# Patient Record
Sex: FEMALE | Race: NATIVE HAWAIIAN OR OTHER PACIFIC ISLANDER
[De-identification: names, ages, dates, MRNs, and addresses within clinical notes are randomized per-mention and may not be internally consistent; named-entity substitution may affect disease eponyms.]

---

## 2019-10-04 ENCOUNTER — HOSPITAL ENCOUNTER (EMERGENCY)
Dept: HOSPITAL 5 - ED | Age: 49
Discharge: HOME | End: 2019-10-04
Payer: SELF-PAY

## 2019-10-04 VITALS — SYSTOLIC BLOOD PRESSURE: 142 MMHG | DIASTOLIC BLOOD PRESSURE: 78 MMHG

## 2019-10-04 DIAGNOSIS — Z90.49: ICD-10-CM

## 2019-10-04 DIAGNOSIS — I10: ICD-10-CM

## 2019-10-04 DIAGNOSIS — Z79.899: ICD-10-CM

## 2019-10-04 DIAGNOSIS — R42: Primary | ICD-10-CM

## 2019-10-04 LAB
BASOPHILS # (AUTO): 0.1 K/MM3 (ref 0–0.1)
BASOPHILS NFR BLD AUTO: 0.6 % (ref 0–1.8)
BUN SERPL-MCNC: 15 MG/DL (ref 7–17)
BUN/CREAT SERPL: 25 %
CALCIUM SERPL-MCNC: 9.3 MG/DL (ref 8.4–10.2)
EOSINOPHIL # BLD AUTO: 0.1 K/MM3 (ref 0–0.4)
EOSINOPHIL NFR BLD AUTO: 1 % (ref 0–4.3)
HCT VFR BLD CALC: 43.2 % (ref 30.3–42.9)
HEMOLYSIS INDEX: 11
HGB BLD-MCNC: 14.9 GM/DL (ref 10.1–14.3)
LYMPHOCYTES # BLD AUTO: 1.6 K/MM3 (ref 1.2–5.4)
LYMPHOCYTES NFR BLD AUTO: 16.1 % (ref 13.4–35)
MCHC RBC AUTO-ENTMCNC: 34 % (ref 30–34)
MCV RBC AUTO: 90 FL (ref 79–97)
MONOCYTES # (AUTO): 0.7 K/MM3 (ref 0–0.8)
MONOCYTES % (AUTO): 6.9 % (ref 0–7.3)
PLATELET # BLD: 268 K/MM3 (ref 140–440)
RBC # BLD AUTO: 4.81 M/MM3 (ref 3.65–5.03)
T4 FREE SERPL-MCNC: 1.08 NG/DL (ref 0.76–1.46)

## 2019-10-04 PROCEDURE — 84439 ASSAY OF FREE THYROXINE: CPT

## 2019-10-04 PROCEDURE — 93005 ELECTROCARDIOGRAM TRACING: CPT

## 2019-10-04 PROCEDURE — 93010 ELECTROCARDIOGRAM REPORT: CPT

## 2019-10-04 PROCEDURE — 80048 BASIC METABOLIC PNL TOTAL CA: CPT

## 2019-10-04 PROCEDURE — 70450 CT HEAD/BRAIN W/O DYE: CPT

## 2019-10-04 PROCEDURE — 83735 ASSAY OF MAGNESIUM: CPT

## 2019-10-04 PROCEDURE — 84484 ASSAY OF TROPONIN QUANT: CPT

## 2019-10-04 PROCEDURE — 82553 CREATINE MB FRACTION: CPT

## 2019-10-04 PROCEDURE — 36415 COLL VENOUS BLD VENIPUNCTURE: CPT

## 2019-10-04 PROCEDURE — 84443 ASSAY THYROID STIM HORMONE: CPT

## 2019-10-04 PROCEDURE — 99284 EMERGENCY DEPT VISIT MOD MDM: CPT

## 2019-10-04 PROCEDURE — 85025 COMPLETE CBC W/AUTO DIFF WBC: CPT

## 2019-10-04 PROCEDURE — 82550 ASSAY OF CK (CPK): CPT

## 2019-10-04 NOTE — CAT SCAN REPORT
CT head without contrast



CLINICAL HISTORY: Dizziness for one day.



FINDINGS: There appear to be a continued a mild paravertebral white matter changes most consistent wi
th microvascular angiopathy. The findings correlate with the previous CT of 8/26/2019. There is no cl
ear CT ends of acute intracranial hemorrhage or significant mass effect. The ventricular system remai
ns unchanged in size and configuration. The visualized nasal sinuses are clear. All CT scans at this 
location are performed using the CT dose reduction for ALARA by means of automated exposure control.





IMPRESSION:



There is no CT evidence of acute intracranial process.



Signer Name: Trav Doe MD 

Signed: 10/4/2019 11:03 AM

 Workstation Name: [id number]

## 2019-10-04 NOTE — EMERGENCY DEPARTMENT REPORT
HPI





- General


Chief Complaint: Dizziness


Time Seen by Provider: 10/04/19 09:13





- HPI


HPI: 





Room 23








The patient is a 49-year-old female presenting with chief complaint of 

dizziness.  The patient states past 3 days she's had a constant dizziness that 

waxes and wanes.  Patient states the dizziness worsens whenever looking down or 

change in position.  Denies any preceding trauma.  Patient admits to supple 

headache.  The patient says she checked her blood pressure home with ultrasound 

to be hypertensive with a systolic of 165.  Patient missed and nausea but denies

vomiting.








Location: [See above]


Duration: [See above]


Quality: [See above]


Severity: [See above]


Timing: [See above]


Context: [See above]


Modifying factors: [See above]


Associated signs and symptoms: [see above]








ED Past Medical Hx





- Past Medical History


Previous Medical History?: Yes


Hx Hypertension: Yes


Additional medical history: history of ectopic pregnancy, vertigo





- Surgical History


Past Surgical History?: Yes


Hx Cholecystectomy: Yes


Hx Appendectomy: Yes


Additional Surgical History: salpingectomy.  .  tummy tuck.  





- Family History


Family history: no significant





- Social History


Smoking Status: Never Smoker


Substance Use Type: None





- Medications


Home Medications: 


                                Home Medications











 Medication  Instructions  Recorded  Confirmed  Last Taken  Type


 


Losartan-Hctz 50-12.5 mg Tab 1 tab PO DAILY 11/10/18 08/26/19 08/26/19 History


 


Aspirin [Aspirin BABY CHEW TAB] 1 mg PO QDAY 19 History


 


Meclizine HCl [Meclizine CHEW] 25 mg PO Q8HR PRN 10 Days #30 19  Unknown 

Rx





 tab.chew    


 


Meclizine [Antivert] 25 mg PO QID PRN #20 tablet 10/04/19  Unknown Rx


 


Ondansetron [Zofran ODT TAB] 8 mg PO Q8HR #20 tab.rapdis 10/04/19  Unknown Rx














ED Review of Systems


ROS: 


Stated complaint: HTN/DIZZINESS


Other details as noted in HPI





Constitutional: denies: fever


Eyes: denies: eye pain


ENT: denies: throat pain


Respiratory: no symptoms reported


Cardiovascular: denies: chest pain


Endocrine: no symptoms reported


Gastrointestinal: nausea.  denies: vomiting


Genitourinary: denies: dysuria


Neurological: headache, vertigo





Physical Exam





- Physical Exam


Vital Signs: 


                                   Vital Signs











  10/04/19 10/04/19 10/04/19





  09:04 09:08 09:16


 


Temperature  98.8 F 


 


Pulse Rate 91 H 91 H 93 H


 


Respiratory 12 20 15





Rate   


 


Blood Pressure  142/89 142/89


 


O2 Sat by Pulse  96 97





Oximetry   














  10/04/19





  09:19


 


Temperature 


 


Pulse Rate 


 


Respiratory 20





Rate 


 


Blood Pressure 


 


O2 Sat by Pulse 96





Oximetry 











Physical Exam: 





GENERAL: The patient is well-developed well-nourished []. []


HEENT: Normocephalic.  Atraumatic.  Extraocular motions are intact.  Patient has

 moist mucous membranes.  No nystagmus


NECK: Supple.  Trachea midline


CHEST/LUNGS: Clear to auscultation.  There is no respiratory distress noted.


HEART/CARDIOVASCULAR: Regular.  There is no tachycardia.  There is no gallop rub

 or murmur.


ABDOMEN: Abdomen is soft, nontender.  Patient has normal bowel sounds.  There is

 no abdominal distention.


SKIN: There is no rash.  There is no edema.  There is no diaphoresis.


NEURO: The patient is awake, alert, and oriented.  The patient is cooperative.  

The patient has no focal neurologic deficits.  The patient has normal speech.  

Cranial nerves II through XII grossly intact, no drift.  No dysmetria noted with

 finger-to-nose bilaterally


MUSCULOSKELETAL:  There is no evidence of acute injury.





ED Course


                                   Vital Signs











  10/04/19 10/04/19 10/04/19





  09:04 09:08 09:16


 


Temperature  98.8 F 


 


Pulse Rate 91 H 91 H 93 H


 


Respiratory 12 20 15





Rate   


 


Blood Pressure  142/89 142/89


 


O2 Sat by Pulse  96 97





Oximetry   














  10/04/19





  09:19


 


Temperature 


 


Pulse Rate 


 


Respiratory 20





Rate 


 


Blood Pressure 


 


O2 Sat by Pulse 96





Oximetry 














ED Medical Decision Making





- Lab Data


Result diagrams: 


                                 10/04/19 09:40





                                 10/04/19 09:40





                                Laboratory Tests











  10/04/19 10/04/19 10/04/19





  09:40 09:40 09:40


 


WBC  10.2  


 


RBC  4.81  


 


Hgb  14.9 H  


 


Hct  43.2 H  


 


MCV  90  


 


MCH  31  


 


MCHC  34  


 


RDW  13.7  


 


Plt Count  268  


 


Lymph % (Auto)  16.1  


 


Mono % (Auto)  6.9  


 


Eos % (Auto)  1.0  


 


Baso % (Auto)  0.6  


 


Lymph #  1.6  


 


Mono #  0.7  


 


Eos #  0.1  


 


Baso #  0.1  


 


Seg Neutrophils %  75.4 H  


 


Seg Neutrophils #  7.7  


 


Sodium   141 


 


Potassium   3.7 


 


Chloride   103.9 


 


Carbon Dioxide   22 


 


Anion Gap   19 


 


BUN   15 


 


Creatinine   0.6 L 


 


Estimated GFR   > 60 


 


BUN/Creatinine Ratio   25 


 


Glucose   109 H 


 


Calcium   9.3 


 


Magnesium   2.00 


 


Total Creatine Kinase   61 


 


CK-MB (CK-2)   1.4 


 


CK-MB (CK-2) Rel Index   2.2 


 


Troponin T   < 0.010 


 


TSH    1.190


 


Free T4    1.08














- EKG Data


-: EKG Interpreted by Me


EKG shows normal: sinus rhythm


Rate: normal





- EKG Data


When compared to previous EKG there are: previous EKG unavailable


Interpretation: nonspecific ST-T wave yessy (T-wave inversion in lead 3)





- Radiology Data


Radiology results: report reviewed (CT head), image reviewed (CT head)





[id number] Cat

 Scan Report Signed Patient: TEOFILO DANIELLE MR#: [id number] : 1970 

Acct:[id number] Age/Sex: 49 / F ADM Date: 10/04/19 Loc: ED Attending Dr: 

Ordering Physician: NOLAN HERR MD Date of Service: 10/04/19 Procedure(s): CT 

head/brain wo con Accession Number(s): [id number] cc: NOLAN HERR MD CT head 

without contrast CLINICAL HISTORY: Dizziness for one day. FINDINGS: There appear

 to be a continued a mild paravertebral white matter changes most consistent 

with microvascular angiopathy. The findings correlate with the previous CT of 

2019. There is no clear CT ends of acute intracranial hemorrhage or s

ignificant mass effect. The ventricular system remains unchanged in size and 

configuration. The visualized nasal sinuses are clear. All CT scans at this 

location are performed using the CT dose reduction for ALARA by means of 

automated exposure control. IMPRESSION: There is no CT evidence of acute i

ntracranial process. Signer Name: Trav Doe MD Signed: 10/4/2019 11:03 

AM Workstation Name: [id number] Transcribed By: MR Dictated By: Trav Doe MD Electronically Authenticated By: Trav Doe MD Signed Date/Time: 

10/04/19 1103 DD/DT: 10/04/19 1059 TD/TT: 





- Differential Diagnosis


vertigo, ICH, intracranial mass


Critical care attestation.: 


If time is entered above; I have spent that time in minutes in the direct care 

of this critically ill patient, excluding procedure time.








ED Disposition


Clinical Impression: 


 Vertigo





Disposition: DC-01 TO HOME OR SELFCARE


Is pt being admited?: No


Does the pt Need Aspirin: No


Condition: Stable


Instructions:  Vertigo (ED)


Additional Instructions: 


Return to the emergency department should you develop worsening symptoms, 

inability to tolerate food or liquids, high fever or any other concerns


Prescriptions: 


Meclizine [Antivert] 25 mg PO QID PRN #20 tablet


 PRN Reason: Vertigo


Ondansetron [Zofran ODT TAB] 8 mg PO Q8HR #20 tab.rapdis


Referrals: 


PRIMARY CARE,MD [Primary Care Provider] - 3-5 Days


FERN MCPHERSON MD [Staff Physician] - 3-5 Days (Dr. Mcpherson is a neurologist.  

Please follow up with him for further evaluation)


Time of Disposition: 13:05

## 2020-01-07 ENCOUNTER — HOSPITAL ENCOUNTER (EMERGENCY)
Dept: HOSPITAL 5 - ED | Age: 50
Discharge: LEFT BEFORE DISCHARGE | End: 2020-01-07
Payer: SELF-PAY

## 2020-01-07 VITALS — DIASTOLIC BLOOD PRESSURE: 84 MMHG | SYSTOLIC BLOOD PRESSURE: 134 MMHG

## 2020-01-07 DIAGNOSIS — Z53.21: ICD-10-CM

## 2020-01-07 DIAGNOSIS — R07.89: Primary | ICD-10-CM

## 2020-01-07 LAB
BASOPHILS # (AUTO): 0.1 K/MM3 (ref 0–0.1)
BASOPHILS NFR BLD AUTO: 1.1 % (ref 0–1.8)
BUN SERPL-MCNC: 11 MG/DL (ref 7–17)
BUN/CREAT SERPL: 22 %
CALCIUM SERPL-MCNC: 9.6 MG/DL (ref 8.4–10.2)
EOSINOPHIL # BLD AUTO: 0.1 K/MM3 (ref 0–0.4)
EOSINOPHIL NFR BLD AUTO: 1.1 % (ref 0–4.3)
HCT VFR BLD CALC: 47.9 % (ref 30.3–42.9)
HEMOLYSIS INDEX: 15
HGB BLD-MCNC: 16.1 GM/DL (ref 10.1–14.3)
LYMPHOCYTES # BLD AUTO: 1.9 K/MM3 (ref 1.2–5.4)
LYMPHOCYTES NFR BLD AUTO: 20.2 % (ref 13.4–35)
MCHC RBC AUTO-ENTMCNC: 34 % (ref 30–34)
MCV RBC AUTO: 92 FL (ref 79–97)
MONOCYTES # (AUTO): 0.6 K/MM3 (ref 0–0.8)
MONOCYTES % (AUTO): 5.7 % (ref 0–7.3)
PLATELET # BLD: 273 K/MM3 (ref 140–440)
RBC # BLD AUTO: 5.19 M/MM3 (ref 3.65–5.03)

## 2020-01-07 PROCEDURE — 93005 ELECTROCARDIOGRAM TRACING: CPT

## 2020-01-07 PROCEDURE — 71045 X-RAY EXAM CHEST 1 VIEW: CPT

## 2020-01-07 PROCEDURE — 85025 COMPLETE CBC W/AUTO DIFF WBC: CPT

## 2020-01-07 PROCEDURE — 93010 ELECTROCARDIOGRAM REPORT: CPT

## 2020-01-07 PROCEDURE — 84484 ASSAY OF TROPONIN QUANT: CPT

## 2020-01-07 PROCEDURE — 80048 BASIC METABOLIC PNL TOTAL CA: CPT

## 2020-01-07 PROCEDURE — 36415 COLL VENOUS BLD VENIPUNCTURE: CPT

## 2020-01-07 NOTE — XRAY REPORT
CHEST 1 VIEW 11:23 AM



INDICATION / CLINICAL INFORMATION:

Chest pain and dizziness since 7:00 AM today. Midsternal pressure with associated dyspnea.



COMPARISON: 

8/26/2019.



FINDINGS:



SUPPORT DEVICES: None.

HEART / MEDIASTINUM: The heart size and pulmonary vasculature are normal. The aorta is normal in tamiko
ross. 

LUNGS / PLEURA: No significant pulmonary or pleural abnormality. No pneumothorax. 

ADDITIONAL FINDINGS: No significant additional findings.



IMPRESSION: No acute abnormality or significant change.



Signer Name: Cruz Healy MD 

Signed: 1/7/2020 11:24 AM

 Workstation Name: [id number]

## 2020-07-22 ENCOUNTER — HOSPITAL ENCOUNTER (EMERGENCY)
Dept: HOSPITAL 5 - ED | Age: 50
Discharge: LEFT BEFORE DISCHARGE | End: 2020-07-22
Payer: SELF-PAY

## 2020-07-22 VITALS — SYSTOLIC BLOOD PRESSURE: 106 MMHG | DIASTOLIC BLOOD PRESSURE: 78 MMHG

## 2020-07-22 DIAGNOSIS — Z53.21: ICD-10-CM

## 2020-07-22 DIAGNOSIS — E16.2: Primary | ICD-10-CM
